# Patient Record
Sex: FEMALE | Race: WHITE
[De-identification: names, ages, dates, MRNs, and addresses within clinical notes are randomized per-mention and may not be internally consistent; named-entity substitution may affect disease eponyms.]

---

## 2022-05-18 ENCOUNTER — HOSPITAL ENCOUNTER (EMERGENCY)
Dept: HOSPITAL 75 - ER FS | Age: 67
LOS: 1 days | Discharge: ANOTHER HOSPITAL | End: 2022-05-19
Payer: MEDICARE

## 2022-05-18 VITALS — HEIGHT: 62.99 IN | WEIGHT: 238.1 LBS | BODY MASS INDEX: 42.19 KG/M2

## 2022-05-18 DIAGNOSIS — Z79.899: ICD-10-CM

## 2022-05-18 DIAGNOSIS — I21.4: Primary | ICD-10-CM

## 2022-05-18 DIAGNOSIS — F41.0: ICD-10-CM

## 2022-05-18 DIAGNOSIS — E66.9: ICD-10-CM

## 2022-05-18 LAB
ALBUMIN SERPL-MCNC: 4.6 GM/DL (ref 3.2–4.5)
ALP SERPL-CCNC: 95 U/L (ref 40–136)
ALT SERPL-CCNC: 20 U/L (ref 0–55)
APTT BLD: 27 SEC (ref 24–35)
BASOPHILS # BLD AUTO: 0.1 10^3/UL (ref 0–0.1)
BASOPHILS NFR BLD AUTO: 1 % (ref 0–10)
BILIRUB SERPL-MCNC: 0.2 MG/DL (ref 0.1–1)
BUN/CREAT SERPL: 25
CALCIUM SERPL-MCNC: 9.5 MG/DL (ref 8.5–10.1)
CHLORIDE SERPL-SCNC: 101 MMOL/L (ref 98–107)
CO2 SERPL-SCNC: 22 MMOL/L (ref 21–32)
CREAT SERPL-MCNC: 0.69 MG/DL (ref 0.6–1.3)
EOSINOPHIL # BLD AUTO: 0.2 10^3/UL (ref 0–0.3)
EOSINOPHIL NFR BLD AUTO: 3 % (ref 0–10)
GFR SERPLBLD BASED ON 1.73 SQ M-ARVRAT: 95 ML/MIN
GLUCOSE SERPL-MCNC: 124 MG/DL (ref 70–105)
HCT VFR BLD CALC: 43 % (ref 35–52)
HGB BLD-MCNC: 13.9 G/DL (ref 11.5–16)
INR PPP: 1 (ref 0.8–1.4)
LIPASE SERPL-CCNC: 37 U/L (ref 8–78)
LYMPHOCYTES # BLD AUTO: 1.7 10^3/UL (ref 1–4)
LYMPHOCYTES NFR BLD AUTO: 24 % (ref 12–44)
MAGNESIUM SERPL-MCNC: 2.1 MG/DL (ref 1.6–2.4)
MANUAL DIFFERENTIAL PERFORMED BLD QL: NO
MCH RBC QN AUTO: 29 PG (ref 25–34)
MCHC RBC AUTO-ENTMCNC: 33 G/DL (ref 32–36)
MCV RBC AUTO: 88 FL (ref 80–99)
MONOCYTES # BLD AUTO: 0.8 10^3/UL (ref 0–1)
MONOCYTES NFR BLD AUTO: 11 % (ref 0–12)
NEUTROPHILS # BLD AUTO: 4.4 10^3/UL (ref 1.8–7.8)
NEUTROPHILS NFR BLD AUTO: 61 % (ref 42–75)
PLATELET # BLD: 230 10^3/UL (ref 130–400)
PMV BLD AUTO: 10.5 FL (ref 9–12.2)
POTASSIUM SERPL-SCNC: 4.5 MMOL/L (ref 3.6–5)
PROT SERPL-MCNC: 7.7 GM/DL (ref 6.4–8.2)
PROTHROMBIN TIME: 13 SEC (ref 12.2–14.7)
SODIUM SERPL-SCNC: 138 MMOL/L (ref 135–145)
WBC # BLD AUTO: 7.2 10^3/UL (ref 4.3–11)

## 2022-05-18 PROCEDURE — 83874 ASSAY OF MYOGLOBIN: CPT

## 2022-05-18 PROCEDURE — 80053 COMPREHEN METABOLIC PANEL: CPT

## 2022-05-18 PROCEDURE — 71045 X-RAY EXAM CHEST 1 VIEW: CPT

## 2022-05-18 PROCEDURE — 80061 LIPID PANEL: CPT

## 2022-05-18 PROCEDURE — 85610 PROTHROMBIN TIME: CPT

## 2022-05-18 PROCEDURE — 83690 ASSAY OF LIPASE: CPT

## 2022-05-18 PROCEDURE — 87636 SARSCOV2 & INF A&B AMP PRB: CPT

## 2022-05-18 PROCEDURE — 36415 COLL VENOUS BLD VENIPUNCTURE: CPT

## 2022-05-18 PROCEDURE — 85025 COMPLETE CBC W/AUTO DIFF WBC: CPT

## 2022-05-18 PROCEDURE — 85730 THROMBOPLASTIN TIME PARTIAL: CPT

## 2022-05-18 PROCEDURE — 83735 ASSAY OF MAGNESIUM: CPT

## 2022-05-18 PROCEDURE — 84484 ASSAY OF TROPONIN QUANT: CPT

## 2022-05-18 NOTE — ED GI
General


Chief Complaint:  Abdominal/GI Problems


Stated Complaint:  INDIGESTION,TIGHTNESS IN THROAT


Nursing Triage Note:  


Pt c/o "indigestion" x 24 hours that comes and goes and radiates up to her left 


neck. Pt denies CP or SOA. Pt reports she has felt "anxious" the past few days 


and did take a Xanax at 8pm from a previous rx. Denies hx of anxiety.


Source of Information:  Patient, Spouse


Exam Limitations:  No Limitations





History of Present Illness


Date Seen by Provider:  May 18, 2022


Time Seen by Provider:  22:04


Initial Comments


Patient to the ER by private conveyance from home with chief complaint that she 

is been having some indigestion for the past 1 day and it radiates up into her 

neck and left shoulder.  She describes it as a tightening gripping feeling and 

she has had this feeling in the past associated with an anxiety attack.  She 

does not get them frequently.  She says when she was choking on some chicken 3 

years ago while at work she was taken over to a surgeons clinic and they got her

to expel the chicken by vomiting and afterward she had a panic attack so they 

gave her a prescription for a handful of Xanax.  She had not used them again 

until tonight and by the time she arrived to the ER her symptoms had all gone 

away.  She says she has had to use cimetidine more frequently for increasing 

acid reflux which she associates with her increased use of ibuprofen once or 

twice a day for her right hip pain.  She has been dealing with a right hip pain 

for the past couple years and has finally decided to follow-up with an 

orthopedic surgeon for it.  She says she has had a right shoulder surgery in the

past but no other significant medical history.  Other than obesity she does not 

endorse hypertension, hyperlipidemia, diabetes, coronary disease or early onset 

of familial coronary disease.  She does not follow a primary care doctor 

routinely.  She does not smoke.





Allergies and Home Medications


Allergies


Coded Allergies:  


     No Known Drug Allergies (Unverified , 22)





Patient Home Medication List


Home Medication List Reviewed:  Yes





Review of Systems


Review of Systems


Constitutional:  No chills, No fever, No malaise


EENTM:  No Blurred Vision, No Double Vision


Respiratory:  Denies Cough; Shortness of Air


Cardiovascular:  See HPI, Chest Pain; Denies Lightheadedness


Gastrointestinal:  Denies Constipated, Denies Diarrhea, Denies Difficulty 

Swallowing, Denies Nausea


Genitourinary:  Denies Burning, Denies Discharge


Musculoskeletal:  No back pain, No joint pain





All Other Systems Reviewed


Negative Unless Noted:  Yes





Past Medical-Social-Family Hx


Patient Social History


Tobacco Use?:  No


Use of E-Cig and/or Vaping dev:  No


Substance use?:  No


Alcohol Use?:  No


Pt feels they are or have been:  No





Immunizations Up To Date


Influenza Vaccine Up-to-Date:  Yes; Up-to-Date





Physical Exam


Vital Signs





Vital Signs - First Documented








 22





 22:03


 


Temp 36.7


 


Pulse 108


 


Resp 21


 


B/P (MAP) 187/132 (150)


 


Pulse Ox 98


 


O2 Delivery Room Air





Capillary Refill : Less Than 3 Seconds


Height/Weight/BMI


Height: '"


Weight: lbs. oz. kg; 42.00 BMI


Method:


General Appearance:  WD/WN, no apparent distress (Anxious affect), obese


HEENT:  PERRL/EOMI, pharynx normal


Neck:  non-tender, full range of motion, supple, normal inspection


Respiratory:  chest non-tender, lungs clear, normal breath sounds, no 

respiratory distress, no accessory muscle use


Cardiovascular:  normal peripheral pulses, regular rate, rhythm


Peripheral Pulses:  2+ Radial Pulses (R), 2+ Radial Pulses (L)


Extremities:  normal range of motion, normal inspection, no pedal edema, normal 

capillary refill


Neurologic/Psychiatric:  alert, normal mood/affect, oriented x 3


Skin:  normal color, warm/dry





Progress/Results/Core Measures


Results/Orders


Lab Results





Laboratory Tests








Test


 22


21:55 22


23:50 Range/Units


 


 


White Blood Count


 7.2 


 


 4.3-11.0


10^3/uL


 


Red Blood Count


 4.86 


 


 3.80-5.11


10^6/uL


 


Hemoglobin 13.9   11.5-16.0  g/dL


 


Hematocrit 43   35-52  %


 


Mean Corpuscular Volume 88   80-99  fL


 


Mean Corpuscular Hemoglobin 29   25-34  pg


 


Mean Corpuscular Hemoglobin


Concent 33 


 


 32-36  g/dL





 


Red Cell Distribution Width 13.0   10.0-14.5  %


 


Platelet Count


 230 


 


 130-400


10^3/uL


 


Mean Platelet Volume 10.5   9.0-12.2  fL


 


Immature Granulocyte % (Auto) 0    %


 


Neutrophils (%) (Auto) 61   42-75  %


 


Lymphocytes (%) (Auto) 24   12-44  %


 


Monocytes (%) (Auto) 11   0-12  %


 


Eosinophils (%) (Auto) 3   0-10  %


 


Basophils (%) (Auto) 1   0-10  %


 


Neutrophils # (Auto)


 4.4 


 


 1.8-7.8


10^3/uL


 


Lymphocytes # (Auto)


 1.7 


 


 1.0-4.0


10^3/uL


 


Monocytes # (Auto)


 0.8 


 


 0.0-1.0


10^3/uL


 


Eosinophils # (Auto)


 0.2 


 


 0.0-0.3


10^3/uL


 


Basophils # (Auto)


 0.1 


 


 0.0-0.1


10^3/uL


 


Immature Granulocyte # (Auto)


 0.0 


 


 0.0-0.1


10^3/uL


 


Prothrombin Time 13.0   12.2-14.7  SEC


 


INR Comment 1.0   0.8-1.4  


 


Activated Partial


Thromboplast Time 27 


 


 24-35  SEC





 


Sodium Level 138   135-145  MMOL/L


 


Potassium Level 4.5   3.6-5.0  MMOL/L


 


Chloride Level 101     MMOL/L


 


Carbon Dioxide Level 22   21-32  MMOL/L


 


Anion Gap 15 H  5-14  MMOL/L


 


Blood Urea Nitrogen 17   7-18  MG/DL


 


Creatinine


 0.69 


 


 0.60-1.30


MG/DL


 


Estimat Glomerular Filtration


Rate 95 


 


  





 


BUN/Creatinine Ratio 25    


 


Glucose Level 124 H    MG/DL


 


Calcium Level 9.5   8.5-10.1  MG/DL


 


Corrected Calcium    8.5-10.1  MG/DL


 


Magnesium Level 2.1   1.6-2.4  MG/DL


 


Total Bilirubin 0.2   0.1-1.0  MG/DL


 


Aspartate Amino Transf


(AST/SGOT) 34 


 


 5-34  U/L





 


Alanine Aminotransferase


(ALT/SGPT) 20 


 


 0-55  U/L





 


Alkaline Phosphatase 95     U/L


 


Myoglobin


 61.8 


 


 10.0-92.0


NG/ML


 


Troponin I 2.16 *H  <0.30  NG/ML


 


Total Protein 7.7   6.4-8.2  GM/DL


 


Albumin 4.6 H  3.2-4.5  GM/DL


 


Lipase 37   8-78  U/L








My Orders





Orders - ANA MARIA VALDIVIA


Continuous Ekg Monitoring (22 21:40)


Ekg Tracing (22 21:40)


Cbc With Automated Diff (22:)


Magnesium (22:)


Chest 1 View Ap/Pa Only (22:)


Ekg Tracing (22:)


Comprehensive Metabolic Panel (22:)


Myoglobin Serum (22:)


Protime With Inr (22:)


Partial Thromboplastin Time (22:)


O2 (22:)


Lipid Panel (22 06:00)


Aspirin Chewable Tablet (Baby Aspirin Ch (22 22:30)


Ed Iv/Invasive Line Start (22:)


Lipase (22:)


Troponin I Fs (22:)


Lorazepam Tablet (Ativan Tablet) (22:)


Lorazepam Tablet (Ativan Tablet) (22 22:28)


Labetalol Injection (Normodyne Injection (22 23:15)


Heparin  Drip 59231 Unit/500ml (Heparin (22 23:30)


Heparin (Bolus Per Protocol) (Heparin (B (22 23:30)


Initiate Heparin Acs Protocol (22 23:18)


Metoprolol Succinate (Xl) Tab (Toprol Xl (22 23:30)


Covid 19 Inhouse Test (22 23:38)


Influenza A And B By Pcr (22 23:38)





Medications Given in ED





Current Medications








 Medications  Dose


 Ordered  Sig/Ashli


 Route  Start Time


 Stop Time Status Last Admin


Dose Admin


 


 Aspirin  324 mg  ONCE  ONCE


 PO  22 22:30


 22 22:31 DC 22 22:33


324 MG


 


 Heparin Sodium


  (Porcine)  ACS


 PROTOCOL


 60 uni...  PRN  PRN


 IV  22 23:30


 22 00:46 DC 22 23:35


5,000 UNIT


 


 Labetalol HCl  20 mg  ONCE  ONCE


 IV  22 23:15


 22 23:16 DC 22 23:15


20 MG


 


 Metoprolol


 Succinate  100 mg  ONCE  ONCE


 PO  22 23:30


 22 23:31 DC 22 23:33


100 MG








Vital Signs/I&O











 22





 22:03 22:15 22:30 22:45


 


Temp 36.7   


 


Pulse 108 979 100 113


 


Resp 21  20 18


 


B/P (MAP) 187/132 (150) 210/139 206/136 217/131


 


Pulse Ox 98  97 96


 


O2 Delivery Room Air  Room Air Room Air


 


    





 22





 23:15 23:30 00:00 00:39


 


Pulse 105 84 83 84


 


Resp 15 19 17 20


 


B/P (MAP) 190/107 150/73 116/92 153/89


 


Pulse Ox 98 95 97 18


 


O2 Delivery Room Air Room Air Room Air Room Air














Blood Pressure Mean:                    150











Progress


Progress Note #1:  


   Time:  22:28


Progress Note


She is asymptomatic now and her symptoms started about 7:00, 3 hours prior to 

arrival.  Her initial EKG is rapid repolarization due to the tachycardia.  Her 

tachycardia persist despite stating her symptoms are gone.  I have concerns for 

atypical angina.  We did discuss possibilities of a stricture although she has 

not experienced any choking on food in the past 3 years since her first incident

so this is less likely.  We have also discussed the outpatient work-up of GERD 

and will recommend some treatments if her cardiac work-up is okay.  Plan to get 

a troponin now and again in an hour which will be 4 to 5 hours after the start 

of her symptoms.  She does appear quite anxious and we have talked her into a 

small dose of Ativan, half milligram p.o.  Her blood pressure significantly caitlin

vated 206/136 but she claims it is never this high.  She says at home she 

checked her pulse and it was around 70.  Certainly anxiety could be part of 

this.  Aspirin 324 mg to chew and swallow.


Progress Note #2:  


   Time:  23:16


Progress Note


Labetalol 20 mg and will give her 5000 units of heparin and initiate a heparin 

drip.


Progress Note #3:  


   Time:  23:44


Progress Note


The patient and her  had a conversation with her son and have excited 

that they are prefer to go to [facility] where he works as a nurse 

practitioner on the neurology services.  She does not have a primary care 

provider. 


2310: We spoke to Dr. Montgomery and left a message with Dr. Bynum.  He had 

returned their call and we got cut off on our second conversation as the 

cardiologist had to redirect his attention elsewhere.


Initial ECG Impression Date:  May 18, 2022


Initial ECG Impression Time:  21:50


Initial ECG Rate:  108


Initial ECG Rhythm:  S.Tach


Initial ECG Intervals:  Normal


Initial ECG Impression:  Normal, Nonspecific Changes


Initial ECG Comparisson:  No Previous ECG Available


Comment


Sinus tachycardia with left mean electrical axis deviation.  There is about half

to 1 block of marginal ST depression in leads II, III and aVF.  About a half a 

block of ST elevation in leads V2 without concurrent leads.  No ST elevation MI.


EKG :  


   EKG Time:  22:59


   Rate:  100


   Rhythm:  S.Tach


   Intervals:  Normal


   ECG Comparisson:  Unchanged


   ECG Impression:  Nonspecific Changes


Comment


Sinus tachycardia with LVH and same ST depression on one-to-one blocks seen in 

leads III, aVF and perhaps a half a block of ST depression in lead II.





Diagnostic Imaging





   Diagonstic Imaging:  Xray


   Plain Films/CT/US/NM/MRI:  chest


Comments


                 [facility] [place], [place]





NAME:   GEORGETTE ALVARADO


MED REC#:   [id number]


ACCOUNT#:   [id number]


PT STATUS:   REG ER


:   [id number]


PHYSICIAN:   ANA MARIA VALDIVIA MD


ADMIT DATE:   22/ER FS


                                  ***Signed***


Date of Exam:22





CHEST 1 VIEW AP/PA ONLY








EXAMINATION: Chest 1 view.





HISTORY: Throat tightness. Indigestion.





COMPARISON: None available.





FINDINGS: The lung volumes are normal. No focal consolidation is


seen. No large pleural effusion or pneumothorax is seen. The


cardiomediastinal silhouette is normal in size and contour. No


acute osseous abnormality is seen.





IMPRESSION: No acute pleuroparenchymal process. 





Dictated by: 





  Dictated on workstation # [id number]








Dict:   22


Trans:   22


[facility] [id number]





Interpreted by:     CLEVE PUGH DO


Electronically signed by: CLEVE PUGH 


   Reviewed:  Reviewed by Me





Departure


Impression





   Primary Impression:  


   NSTEMI (non-ST elevated myocardial infarction)


Disposition:  02 XFER SHT-TRM HOSP


Condition:  Stable





Transfer


Transfer Reason:  Patient preference


Time Spoke to Accepting Phy:  23:47


Transfer Progress Notes


Spoke to triage nurse at [phone] Dr. Maza called back and accepted the patient at [facility]. 

Attending Dr Lawson.


Transfer Time:  00:31


Transfer Facility:  


[facility]


Method of Transfer:  EMS





Departure-Patient Inst.


Referrals:  


SOLE AGUIRRE MD (PCP/Family)


Primary Care Physician











ANA MARIA VALDIVIA                 May 18, 2022 22:31

## 2022-05-18 NOTE — DIAGNOSTIC IMAGING REPORT
EXAMINATION: Chest 1 view.



HISTORY: Throat tightness. Indigestion.



COMPARISON: None available.



FINDINGS: The lung volumes are normal. No focal consolidation is

seen. No large pleural effusion or pneumothorax is seen. The

cardiomediastinal silhouette is normal in size and contour. No

acute osseous abnormality is seen.



IMPRESSION: No acute pleuroparenchymal process. 



Dictated by: 



  Dictated on workstation # [id number]

## 2022-05-19 VITALS — SYSTOLIC BLOOD PRESSURE: 153 MMHG | DIASTOLIC BLOOD PRESSURE: 89 MMHG

## 2022-05-19 LAB
CHOLEST SERPL-MCNC: 274 MG/DL (ref ?–200)
HDLC SERPL-MCNC: 53 MG/DL (ref 40–60)
TRIGL SERPL-MCNC: 266 MG/DL (ref ?–150)
VLDLC SERPL CALC-MCNC: 53 MG/DL (ref 5–40)